# Patient Record
Sex: MALE | Race: ASIAN | NOT HISPANIC OR LATINO
[De-identification: names, ages, dates, MRNs, and addresses within clinical notes are randomized per-mention and may not be internally consistent; named-entity substitution may affect disease eponyms.]

---

## 2023-12-24 PROBLEM — Z00.00 ENCOUNTER FOR PREVENTIVE HEALTH EXAMINATION: Status: ACTIVE | Noted: 2023-12-24

## 2023-12-27 ENCOUNTER — APPOINTMENT (OUTPATIENT)
Dept: HEPATOLOGY | Facility: CLINIC | Age: 33
End: 2023-12-27
Payer: COMMERCIAL

## 2023-12-27 VITALS
DIASTOLIC BLOOD PRESSURE: 85 MMHG | HEART RATE: 93 BPM | OXYGEN SATURATION: 95 % | WEIGHT: 219 LBS | RESPIRATION RATE: 18 BRPM | TEMPERATURE: 97.9 F | HEIGHT: 71.65 IN | BODY MASS INDEX: 29.99 KG/M2 | SYSTOLIC BLOOD PRESSURE: 151 MMHG

## 2023-12-27 PROCEDURE — 99204 OFFICE O/P NEW MOD 45 MIN: CPT

## 2023-12-29 LAB
A1AT SERPL-MCNC: 127 MG/DL
AFP-TM SERPL-MCNC: 2.2 NG/ML
ALBUMIN SERPL ELPH-MCNC: 4.7 G/DL
ALP BLD-CCNC: 97 U/L
ALT SERPL-CCNC: 49 U/L
ANA SER IF-ACNC: NEGATIVE
ANION GAP SERPL CALC-SCNC: 13 MMOL/L
AST SERPL-CCNC: 28 U/L
BASOPHILS # BLD AUTO: 0.05 K/UL
BASOPHILS NFR BLD AUTO: 0.7 %
BILIRUB SERPL-MCNC: 0.7 MG/DL
BUN SERPL-MCNC: 14 MG/DL
CALCIUM SERPL-MCNC: 9.4 MG/DL
CERULOPLASMIN SERPL-MCNC: 18 MG/DL
CHLORIDE SERPL-SCNC: 106 MMOL/L
CO2 SERPL-SCNC: 24 MMOL/L
CREAT SERPL-MCNC: 1.08 MG/DL
EGFR: 92 ML/MIN/1.73M2
EOSINOPHIL # BLD AUTO: 0.47 K/UL
EOSINOPHIL NFR BLD AUTO: 6.4 %
FERRITIN SERPL-MCNC: 852 NG/ML
GGT SERPL-CCNC: 60 U/L
GLUCOSE SERPL-MCNC: 101 MG/DL
HBV CORE IGG+IGM SER QL: NONREACTIVE
HBV SURFACE AB SERPL IA-ACNC: 257.9 MIU/ML
HBV SURFACE AG SER QL: ABNORMAL
HCT VFR BLD CALC: 48.6 %
HCV AB SER QL: NONREACTIVE
HCV S/CO RATIO: 0.15 S/CO
HEPATITIS A IGG ANTIBODY: NONREACTIVE
HGB BLD-MCNC: 15.9 G/DL
IGG SER QL IEP: 1325 MG/DL
IMM GRANULOCYTES NFR BLD AUTO: 0.4 %
INR PPP: 0.87 RATIO
IRON SATN MFR SERPL: 32 %
IRON SERPL-MCNC: 106 UG/DL
LYMPHOCYTES # BLD AUTO: 1.69 K/UL
LYMPHOCYTES NFR BLD AUTO: 23.1 %
MAN DIFF?: NORMAL
MCHC RBC-ENTMCNC: 32.7 GM/DL
MCHC RBC-ENTMCNC: 32.8 PG
MCV RBC AUTO: 100.2 FL
MITOCHONDRIA AB SER IF-ACNC: NORMAL
MONOCYTES # BLD AUTO: 0.62 K/UL
MONOCYTES NFR BLD AUTO: 8.5 %
NEUTROPHILS # BLD AUTO: 4.47 K/UL
NEUTROPHILS NFR BLD AUTO: 60.9 %
PLATELET # BLD AUTO: 263 K/UL
POTASSIUM SERPL-SCNC: 4.3 MMOL/L
PROT SERPL-MCNC: 7.1 G/DL
PT BLD: 10 SEC
RBC # BLD: 4.85 M/UL
RBC # FLD: 11.6 %
SMOOTH MUSCLE AB SER QL IF: NORMAL
SODIUM SERPL-SCNC: 143 MMOL/L
TIBC SERPL-MCNC: 331 UG/DL
UIBC SERPL-MCNC: 225 UG/DL
WBC # FLD AUTO: 7.33 K/UL

## 2023-12-29 NOTE — REVIEW OF SYSTEMS
[Fever] : no fever [Chills] : no chills [Feeling Poorly] : not feeling poorly [Feeling Tired] : not feeling tired [Nosebleeds] : no nosebleeds [Nasal Discharge] : no nasal discharge [Sore Throat] : no sore throat [Hoarseness] : no hoarseness [Chest Pain] : no chest pain [Palpitations] : no palpitations [Leg Claudication] : no intermittent leg claudication [Lower Ext Edema] : no extremity edema [Shortness Of Breath] : no shortness of breath [Wheezing] : no wheezing [Cough] : no cough [SOB on Exertion] : no shortness of breath during exertion [Abdominal Pain] : no abdominal pain [Vomiting] : no vomiting [Constipation] : no constipation [Diarrhea] : no diarrhea [Itching] : no itching

## 2023-12-29 NOTE — HISTORY OF PRESENT ILLNESS
[FreeTextEntry1] : 32 y/o M h/o G6PD deficiency anemia (diagnosed @ 1 years old) referred by Dr. Nikolay Chaves for elevated LFTs. Pt first had elevated LFTs end of 2022. Pt was referred to a hepatologist and was told he has fatty liver. He was told to diet/exercise to lose weight. Pt lost 16lb since last year. Since the weight lost, LFTs improved but did not normalized. So pt was referred to us for second opinion.   Today pt reports feeling well. Denies fever, chills, n/v/d/c, SOB, CP, HA, dizziness, abd pain, jaundice. Denies black stool, blood in stool. Good apettite.  Usual Diet- breakfast: oatmeal or homemade muffin.  Lunch: miso soup with noodle and veg.  Dinner: Clear bone broth with veg Exercise: every other weekend chasing and playing with kid.  Medical Hx  Surgical Hx: none Social Hx:  Tobacco: Never  Alcohol: stopped 18 months ago. Prior that that, 1-2 times per month, each time just 1 drink. illicit drug: no  Herb and dietary Supplement: no  FMH of liver disease: both parents with HTN. Mother with DM. Father with nasal cancer.

## 2023-12-29 NOTE — ASSESSMENT
[FreeTextEntry1] : 34 y/o M h/o G6PD deficiency anemia (diagnosed @ 1 years old) referred by Dr. Nikolay Chaves for elevated LFTs. Pt first had elevated LFTs end of 2022. Pt was referred to a hepatologist and was told he has fatty liver. He was told to diet/exercise to lose weight. Pt lost 16lb since last year. Since the weight lost, LFTs improved but did not normalized. So pt was referred to us for second opinion.   Today pt reports feeling well. Denies fever, chills, n/v/d/c, SOB, CP, HA, dizziness, abd pain, jaundice. Denies black stool, blood in stool. Good apettite.   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 Advised to keep a low carb diet. Cont to avoid alcohol.  Labs today to r/o viral and AIH. Schedule fibroscan at 400 CD.  RTO.

## 2024-01-05 LAB
TTG IGA SER IA-ACNC: <1.2 U/ML
TTG IGA SER-ACNC: NEGATIVE

## 2024-01-18 ENCOUNTER — APPOINTMENT (OUTPATIENT)
Dept: HEPATOLOGY | Facility: CLINIC | Age: 34
End: 2024-01-18
Payer: COMMERCIAL

## 2024-01-18 DIAGNOSIS — K76.0 FATTY (CHANGE OF) LIVER, NOT ELSEWHERE CLASSIFIED: ICD-10-CM

## 2024-01-18 DIAGNOSIS — R74.8 ABNORMAL LEVELS OF OTHER SERUM ENZYMES: ICD-10-CM

## 2024-01-18 PROCEDURE — 76981 USE PARENCHYMA: CPT

## 2024-01-19 PROBLEM — R74.8 ELEVATED LIVER ENZYMES: Status: ACTIVE | Noted: 2023-12-27

## 2024-01-19 PROBLEM — K76.0 FATTY LIVER: Status: ACTIVE | Noted: 2023-12-27

## 2024-01-22 ENCOUNTER — NON-APPOINTMENT (OUTPATIENT)
Age: 34
End: 2024-01-22

## 2024-01-22 LAB
HBV CORE IGG+IGM SER QL: NONREACTIVE
HBV CORE IGM SER QL: NONREACTIVE
HBV SURFACE AG SER QL: ABNORMAL

## 2024-05-08 ENCOUNTER — APPOINTMENT (OUTPATIENT)
Dept: HEPATOLOGY | Facility: CLINIC | Age: 34
End: 2024-05-08